# Patient Record
Sex: MALE | Race: WHITE | NOT HISPANIC OR LATINO | URBAN - METROPOLITAN AREA
[De-identification: names, ages, dates, MRNs, and addresses within clinical notes are randomized per-mention and may not be internally consistent; named-entity substitution may affect disease eponyms.]

---

## 2017-09-26 ENCOUNTER — EMERGENCY (EMERGENCY)
Facility: HOSPITAL | Age: 72
LOS: 1 days | Discharge: HOME | End: 2017-09-26
Attending: EMERGENCY MEDICINE
Payer: MEDICARE

## 2017-09-26 VITALS
RESPIRATION RATE: 16 BRPM | SYSTOLIC BLOOD PRESSURE: 144 MMHG | HEART RATE: 75 BPM | TEMPERATURE: 98 F | DIASTOLIC BLOOD PRESSURE: 84 MMHG | HEIGHT: 71 IN | OXYGEN SATURATION: 97 % | WEIGHT: 184.97 LBS

## 2017-09-26 DIAGNOSIS — L02.414 CUTANEOUS ABSCESS OF LEFT UPPER LIMB: ICD-10-CM

## 2017-09-26 PROCEDURE — 10061 I&D ABSCESS COMP/MULTIPLE: CPT

## 2017-09-26 PROCEDURE — 76882 US LMTD JT/FCL EVL NVASC XTR: CPT | Mod: 26,LT

## 2017-09-26 PROCEDURE — 76882 US LMTD JT/FCL EVL NVASC XTR: CPT

## 2017-09-26 PROCEDURE — 99284 EMERGENCY DEPT VISIT MOD MDM: CPT | Mod: 25

## 2017-09-26 RX ORDER — CEPHALEXIN 500 MG
1 CAPSULE ORAL
Qty: 28 | Refills: 0 | OUTPATIENT
Start: 2017-09-26 | End: 2017-10-03

## 2017-09-26 RX ORDER — AZTREONAM 2 G
1 VIAL (EA) INJECTION
Qty: 14 | Refills: 0 | OUTPATIENT
Start: 2017-09-26 | End: 2017-10-03

## 2017-09-26 RX ORDER — CEPHALEXIN 500 MG
500 CAPSULE ORAL ONCE
Qty: 0 | Refills: 0 | Status: COMPLETED | OUTPATIENT
Start: 2017-09-26 | End: 2017-09-26

## 2017-09-26 RX ADMIN — Medication 500 MILLIGRAM(S): at 18:14

## 2017-09-26 RX ADMIN — Medication 1 TABLET(S): at 14:43

## 2017-09-26 NOTE — ED PROCEDURE NOTE - CPROC ED POST PROC CARE GUIDE1
Verbal/written post procedure instructions were given to patient/caregiver./Instructed patient/caregiver to follow-up with primary care physician./Keep the cast/splint/dressing clean and dry.

## 2017-09-26 NOTE — ED STATDOCS - ATTENDING CONTRIBUTION TO CARE
I, Abhishek Fermin, performed the initial face to face bedside interview with this patient regarding history of present illness, review of symptoms and relevant past medical, social and family history.  I completed an independent physical examination.  I was the provider who initially evaluated this patient.  The history, relevant review of systems, past medical and surgical history, medical decision making, and physical examination was documented by the scribe in my presence and I attest to the accuracy of the documentation. Follow-up on ordered tests (ie labs, radiologic studies) and re-evaluation of the patient's status has been communicated to the ACP.  Disposition of the patient will be based on test outcome and response to ED interventions.

## 2017-09-26 NOTE — ED STATDOCS - PROGRESS NOTE DETAILS
NP NOTE:  73 y/o M sent from dermatologist office for large abscess on upper back.  Patient states that he has had a lesion there for the past 15 years, 10 days ago it began enlarging, getting red and painful.  2 days ago began to drain.   He denies fever, chill, no cp, sob, cough.  On exam: in NAD, non-toxic in appearance, S1S2 rr, no murmur, lungs CTA/BL, abd, + bs x 4, soft NTTP, golf ball size abscess left trapezius, fluctuant, will US, give ABX and have ACS evaluate. NP NOTE:  US reviewed.  Abscess draining pus.  Evaluated by Dr. Forrest XIE for I&D.   Large amount of pus drained from abscess, packing placed.  Will d/c home rx keflex and bactrim, f/u dermatologist office.

## 2017-09-26 NOTE — ED STATDOCS - SKIN, MLM
gold ball sized subcutaneous abscess  left trapezius with fluctuance and erythema . no induration gold ball sized, fluctuant, tender subcutaneous abscess  to posterior aspect of left trapezius with  overlying skin erythema and sinus tract. no induration

## 2017-09-26 NOTE — ED STATDOCS - OBJECTIVE STATEMENT
71 y/o M pt presents to ED c/o abscess to left upper back. Pt states he's had an abscess for 15 years and over the past 10 days it's been painful with burning sensation. Was seen by dermatologist, told to come to ED because of the size.

## 2018-05-17 ENCOUNTER — OTHER (OUTPATIENT)
Age: 73
End: 2018-05-17

## 2018-06-04 ENCOUNTER — APPOINTMENT (OUTPATIENT)
Dept: PHYSICAL MEDICINE AND REHAB | Facility: CLINIC | Age: 73
End: 2018-06-04
Payer: MEDICARE

## 2018-06-04 VITALS
SYSTOLIC BLOOD PRESSURE: 148 MMHG | HEART RATE: 71 BPM | BODY MASS INDEX: 27.92 KG/M2 | DIASTOLIC BLOOD PRESSURE: 82 MMHG | HEIGHT: 70 IN | WEIGHT: 195 LBS

## 2018-06-04 DIAGNOSIS — L40.9 PSORIASIS, UNSPECIFIED: ICD-10-CM

## 2018-06-04 DIAGNOSIS — Z79.1 LONG TERM (CURRENT) USE OF NON-STEROIDAL ANTI-INFLAMMATORIES (NSAID): ICD-10-CM

## 2018-06-04 DIAGNOSIS — M54.16 RADICULOPATHY, LUMBAR REGION: ICD-10-CM

## 2018-06-04 DIAGNOSIS — M46.90 UNSPECIFIED INFLAMMATORY SPONDYLOPATHY, SITE UNSPECIFIED: ICD-10-CM

## 2018-06-04 DIAGNOSIS — R97.20 ELEVATED PROSTATE, SPECIFIC ANTIGEN [PSA]: ICD-10-CM

## 2018-06-04 PROCEDURE — 99204 OFFICE O/P NEW MOD 45 MIN: CPT

## 2018-06-12 ENCOUNTER — APPOINTMENT (OUTPATIENT)
Dept: ORTHOPEDIC SURGERY | Facility: CLINIC | Age: 73
End: 2018-06-12

## 2018-07-09 ENCOUNTER — APPOINTMENT (OUTPATIENT)
Dept: PHYSICAL MEDICINE AND REHAB | Facility: CLINIC | Age: 73
End: 2018-07-09

## 2019-09-06 NOTE — ED ADULT TRIAGE NOTE - RESPIRATORY RATE (BREATHS/MIN)
HPI: 63M with h/o CAD s/p stents, HLD who present with acute epistaxis. This started 8pm while driving and has not stopped. Blood has been coming out of left nostril only, primarily anteriorly. Six days ago he got elbowed while playing basketball and also had few hours of left nostril nosebleed that resolved by holding it with pressure. He is on aspirin and prasugrel. Denied any new trauma, fall, CP, palpitation, SOB, skin bruising, hematemesis, hemoptysis, melena, BRBPR. Never had any bleeding like this before. In ED BP elevated SBP>200, controlled with labetalol.    T(C): 36.7 (09-05-19 @ 23:58), Max: 36.7 (09-05-19 @ 23:58)  HR: 63 (09-06-19 @ 07:01) (57 - 68)  BP: 131/87 (09-06-19 @ 08:12) (131/87 - 195/93)  RR: 18 (09-06-19 @ 07:01) (16 - 20)  SpO2: 98% (09-06-19 @ 07:01) (97% - 100%)  NAD, AOx3  No respiratory distress, stridor, stertor on RA  Voice quality: normal  PERRL, EOMI  Nose: merocel packing on the left with clot, R naris with clot as well, mild anterior drip, no point source of bleeding identified.  OC/OP: tongue and FOM soft, no lesions, OP clear no active bleeding  Neck: soft and flat, no LAD  CN II-XII intact    A/P: 63M with epistaxis on AC with severe HTN now controlled. Epistaxis controlled with packing and BP control  - no acute ENT intervention  - CDU for monitoring  - if no further bleeding in 2-4 hours ok to d/c  - nasal saline sprays QID  - Afrin BID for 3 days  - keep packing in place, return to ED or clinic in 3 days for removal  - abx per ED while packing in place, keflex  - continue aggressive BP control.  - discussed case with attending  - please page with questions
16

## 2022-07-05 ENCOUNTER — NON-APPOINTMENT (OUTPATIENT)
Age: 77
End: 2022-07-05

## 2022-07-05 ENCOUNTER — APPOINTMENT (OUTPATIENT)
Dept: CARDIOLOGY | Facility: CLINIC | Age: 77
End: 2022-07-05

## 2022-07-05 VITALS
DIASTOLIC BLOOD PRESSURE: 64 MMHG | TEMPERATURE: 98.3 F | HEART RATE: 73 BPM | SYSTOLIC BLOOD PRESSURE: 116 MMHG | HEIGHT: 70 IN | OXYGEN SATURATION: 96 % | BODY MASS INDEX: 25.2 KG/M2 | WEIGHT: 176 LBS

## 2022-07-05 VITALS — SYSTOLIC BLOOD PRESSURE: 118 MMHG | DIASTOLIC BLOOD PRESSURE: 64 MMHG

## 2022-07-05 DIAGNOSIS — R53.83 OTHER FATIGUE: ICD-10-CM

## 2022-07-05 DIAGNOSIS — Z87.898 PERSONAL HISTORY OF OTHER SPECIFIED CONDITIONS: ICD-10-CM

## 2022-07-05 DIAGNOSIS — Z86.79 PERSONAL HISTORY OF OTHER DISEASES OF THE CIRCULATORY SYSTEM: ICD-10-CM

## 2022-07-05 DIAGNOSIS — R42 DIZZINESS AND GIDDINESS: ICD-10-CM

## 2022-07-05 DIAGNOSIS — Z78.9 OTHER SPECIFIED HEALTH STATUS: ICD-10-CM

## 2022-07-05 DIAGNOSIS — R06.02 SHORTNESS OF BREATH: ICD-10-CM

## 2022-07-05 DIAGNOSIS — Z86.007 PERSONAL HISTORY OF IN-SITU NEOPLASM OF SKIN: ICD-10-CM

## 2022-07-05 DIAGNOSIS — Z80.0 FAMILY HISTORY OF MALIGNANT NEOPLASM OF DIGESTIVE ORGANS: ICD-10-CM

## 2022-07-05 DIAGNOSIS — Z87.438 PERSONAL HISTORY OF OTHER DISEASES OF MALE GENITAL ORGANS: ICD-10-CM

## 2022-07-05 DIAGNOSIS — Z86.69 PERSONAL HISTORY OF OTHER DISEASES OF THE NERVOUS SYSTEM AND SENSE ORGANS: ICD-10-CM

## 2022-07-05 DIAGNOSIS — Z86.39 PERSONAL HISTORY OF OTHER ENDOCRINE, NUTRITIONAL AND METABOLIC DISEASE: ICD-10-CM

## 2022-07-05 DIAGNOSIS — R06.00 DYSPNEA, UNSPECIFIED: ICD-10-CM

## 2022-07-05 DIAGNOSIS — Z82.49 FAMILY HISTORY OF ISCHEMIC HEART DISEASE AND OTHER DISEASES OF THE CIRCULATORY SYSTEM: ICD-10-CM

## 2022-07-05 PROCEDURE — 99205 OFFICE O/P NEW HI 60 MIN: CPT

## 2022-07-05 PROCEDURE — 93000 ELECTROCARDIOGRAM COMPLETE: CPT

## 2022-07-05 RX ORDER — TRAMADOL HYDROCHLORIDE 50 MG/1
50 TABLET, COATED ORAL 3 TIMES DAILY
Qty: 90 | Refills: 0 | Status: DISCONTINUED | COMMUNITY
Start: 2018-06-04 | End: 2022-07-05

## 2022-07-05 RX ORDER — VERAPAMIL HYDROCHLORIDE 120 MG/1
120 TABLET ORAL
Qty: 90 | Refills: 3 | Status: ACTIVE | COMMUNITY
Start: 2022-05-12 | End: 1900-01-01

## 2022-07-05 RX ORDER — TAMSULOSIN HYDROCHLORIDE 0.4 MG/1
0.4 CAPSULE ORAL
Refills: 0 | Status: ACTIVE | COMMUNITY
Start: 2022-04-03

## 2022-07-05 RX ORDER — CALCIPOTRIENE 50 UG/G
0.01 OINTMENT TOPICAL TWICE DAILY
Qty: 7 | Refills: 0 | Status: DISCONTINUED | COMMUNITY
Start: 2018-06-04 | End: 2022-07-05

## 2022-07-05 RX ORDER — APIXABAN 5 MG/1
5 TABLET, FILM COATED ORAL
Qty: 90 | Refills: 1 | Status: ACTIVE | COMMUNITY
Start: 2022-05-12

## 2022-07-05 RX ORDER — AMOXICILLIN AND CLAVULANATE POTASSIUM 875; 125 MG/1; MG/1
875-125 TABLET, COATED ORAL
Qty: 20 | Refills: 0 | Status: ACTIVE | COMMUNITY
Start: 2022-06-17

## 2022-07-05 RX ORDER — TRAMADOL HYDROCHLORIDE AND ACETAMINOPHEN 37.5; 325 MG/1; MG/1
37.5-325 TABLET, FILM COATED ORAL
Qty: 90 | Refills: 0 | Status: DISCONTINUED | COMMUNITY
Start: 2018-06-04 | End: 2022-07-05

## 2022-07-05 RX ORDER — FENOFIBRIC ACID 45 MG/1
45 CAPSULE, DELAYED RELEASE ORAL DAILY
Refills: 0 | Status: ACTIVE | COMMUNITY

## 2022-07-11 NOTE — REASON FOR VISIT
[Symptom and Test Evaluation] : symptom and test evaluation [FreeTextEntry1] : atrial fibrillation.

## 2022-07-11 NOTE — HISTORY OF PRESENT ILLNESS
[FreeTextEntry1] : atrial fibrillation \par \par \par This is a 77 year old male with history of atrial fibrillation,  BPH, arthritis, here for atrial fibrillation .\par he has been takign iburpofen TID.  that’s the only thing that helps her.  they tried tramadol.  and biologics.  \par he has a chronic back pain.   \par He does not exercise.  He does not see a phyisical therapy. s\par no chest pain . no dyspnea on exertion . no syncope. \par past 2-3 years he has been very fatigue and tired and out of breathe. \par When he was carrying [id number].  he gets shortness of breathe \par \par \par no smoking. no alcohol. no drugs.\par \par family history \par Mother:  No myocardial infarction . no cerebrovascular accident . Cancer. \par father: myocardial infarction  at age 50. \par daughter: atrial fibrillation . cerebrovascular accident .

## 2022-07-11 NOTE — DISCUSSION/SUMMARY
[Patient] : the patient [Risks] : risks [Benefits] : benefits [Alternatives] : alternatives [With Me] : with me [___ Month(s)] : in [unfilled] month(s) [EKG obtained to assist in diagnosis and management of assessed problem(s)] : EKG obtained to assist in diagnosis and management of assessed problem(s) [FreeTextEntry1] : This is a 77 year old male with history of atrial fibrillation,  BPH, arthritis, here for atrial fibrillation .\par \par \par 1) dyspnea on exertion :  intermediate risk factors for coronary artery disease.  echocardiogram with contrast if needed.   Nuclear stress test with IV technetium radiotracer. \par 2)  dizziness: carotid Duplex.  48 hrs holter. take flomax at night.  Reduce verapamil. orthostatic check. \par 3) atrial fibrillation : chadsvasc >2. eliquis 5 BIDreduce verapamil to half tablet because of dizziness. \par 4) Will order and review ECG for the above mentioned diagnosis/condition/symptoms \par 5) back pain:  Physical therapy. pain management,. avoid NSAIDs use. \par 6) daytime sleepijness   and insomnia: sleep study deferred.a

## 2022-07-18 ENCOUNTER — APPOINTMENT (OUTPATIENT)
Dept: CARDIOLOGY | Facility: CLINIC | Age: 77
End: 2022-07-18

## 2022-07-18 PROCEDURE — 93880 EXTRACRANIAL BILAT STUDY: CPT

## 2022-07-18 PROCEDURE — 93306 TTE W/DOPPLER COMPLETE: CPT

## 2022-07-27 ENCOUNTER — APPOINTMENT (OUTPATIENT)
Dept: CARDIOLOGY | Facility: CLINIC | Age: 77
End: 2022-07-27

## 2022-07-27 PROCEDURE — 93352 ADMIN ECG CONTRAST AGENT: CPT

## 2022-07-27 PROCEDURE — 93351 STRESS TTE COMPLETE: CPT

## 2022-07-27 PROCEDURE — 93320 DOPPLER ECHO COMPLETE: CPT

## 2022-08-12 ENCOUNTER — NON-APPOINTMENT (OUTPATIENT)
Age: 77
End: 2022-08-12

## 2023-01-11 ENCOUNTER — APPOINTMENT (OUTPATIENT)
Dept: CARDIOLOGY | Facility: CLINIC | Age: 78
End: 2023-01-11

## 2024-02-10 ENCOUNTER — NON-APPOINTMENT (OUTPATIENT)
Age: 79
End: 2024-02-10